# Patient Record
Sex: MALE | Race: WHITE | Employment: OTHER | URBAN - NONMETROPOLITAN AREA
[De-identification: names, ages, dates, MRNs, and addresses within clinical notes are randomized per-mention and may not be internally consistent; named-entity substitution may affect disease eponyms.]

---

## 2021-10-18 ENCOUNTER — OFFICE VISIT (OUTPATIENT)
Dept: FAMILY MEDICINE CLINIC | Age: 51
End: 2021-10-18

## 2021-10-18 VITALS
WEIGHT: 311.4 LBS | TEMPERATURE: 97.1 F | SYSTOLIC BLOOD PRESSURE: 124 MMHG | DIASTOLIC BLOOD PRESSURE: 82 MMHG | OXYGEN SATURATION: 96 % | HEART RATE: 95 BPM | HEIGHT: 72 IN | BODY MASS INDEX: 42.18 KG/M2 | RESPIRATION RATE: 19 BRPM

## 2021-10-18 DIAGNOSIS — L03.314 CELLULITIS OF RIGHT GROIN: Primary | ICD-10-CM

## 2021-10-18 PROCEDURE — 99214 OFFICE O/P EST MOD 30 MIN: CPT | Performed by: NURSE PRACTITIONER

## 2021-10-18 RX ORDER — SULFAMETHOXAZOLE AND TRIMETHOPRIM 800; 160 MG/1; MG/1
1 TABLET ORAL 2 TIMES DAILY
Qty: 14 TABLET | Refills: 0 | Status: ON HOLD
Start: 2021-10-18 | End: 2021-10-22 | Stop reason: HOSPADM

## 2021-10-18 RX ORDER — CEPHALEXIN 500 MG/1
500 CAPSULE ORAL 4 TIMES DAILY
Qty: 28 CAPSULE | Refills: 0 | Status: ON HOLD
Start: 2021-10-18 | End: 2021-10-22 | Stop reason: SDUPTHER

## 2021-10-18 NOTE — PROGRESS NOTES
Chief Complaint:   Other (patient stated that he had an ingrown hair a year ago. . he now has the same issue going on now. .. once in awhile he has fluid coming out of it. ..no he does not. . put a hot compress on it 3-4 times a day. . it has been a constant discomfort for a day and a half. . he did have a low grade fever last night and the chills. . patient stated that he had testicular cancer when he was 30. )    History of Present Illness   Source of history provided by:  patient. Laurel Han is a 53 y.o. old male who presents to walk-in for evaluation of redness to the scrotum, which began 2 days ago. He has had this intermittently for the past 1 year. Reports the area is warm to touch, moderately painful, and swollen. Denies lymphangitic streaking. There is minimal drainage when he squeezes it. Since onset the symptoms have progressed. He has been using warm compresses with minimal relief. Denies any fever, chills, HA, recent illness, myalgias, nausea, vomiting, or lethargy. He reports he did use a hot water bottle to help with the symptoms, did not use a towel over the bottle and did burn himself on his thigh. Review of Systems    Unless otherwise stated in this report or unable to obtain because of the patient's clinical or mental status as evidenced by the medical record, this patients's positive and negative responses for Review of Systems, constitutional, psych, eyes, ENT, cardiovascular, respiratory, gastrointestinal, neurological, genitourinary, musculoskeletal, integument systems and systems related to the presenting problem are either stated in the preceding or were negative for the symptoms and/or complaints related to the medical problem. Past Medical History:  has a past medical history of Cancer ([address] Depression, Diabetes mellitus ([address] Heart failure ([address] and Hypertension.    Past Surgical History:  has a past surgical history that includes Testicle removal.  Social History: reports that he has never smoked. He has never used smokeless tobacco. He reports current alcohol use of about 10.0 standard drinks of alcohol per week. He reports that he does not use drugs. Family History: family history is not on file. Allergies: Patient has no known allergies. Physical Exam   Vital Signs:  /82   Pulse 95   Temp 97.1 °F (36.2 °C)   Resp 19   Ht 6' (1.829 m)   Wt (!) 311 lb 6.4 oz (141.3 kg)   SpO2 96%   BMI 42.23 kg/m²    Oxygen Saturation Interpretation: Normal.    Constitutional:  Alert, development consistent with age. NAD. Lungs:  CTAB without wheezing, rales, or rhonchi. Heart:  Regular rate and rhythm, no pathologic murmurs, rubs, or gallops. Skin:  Normal turgor and appropriately dry to touch. Erythematous, indurated region over the right groin, consistent with cellulitis. Moderate TTP and warmth over the same area. There is no active bleeding or drainage noted. No lymphangitic streaking. No fluctuance noted. There is a small fluid filled blister noted to the right inner thigh without signs of infection. Neurological:  Orientation age-appropriate unless noted elseware. Motor functions intact. Test Results Section   (All laboratory and radiology results have been personally reviewed by myself)  Labs:  No results found for this visit on 10/18/21. Imaging: All Radiology results interpreted by Radiologist unless otherwise noted. US EXTREMITY RIGHT NON VASC LIMITED    Result Date: 10/18/2021  EXAMINATION: NONVASCULAR ULTRASOUND OF THE RIGHT EXTREMITY 10/18/2021 9:46 am COMPARISON: None. HISTORY: ORDERING SYSTEM PROVIDED HISTORY: Cellulitis of right groin. Rule out abscess TECHNOLOGIST PROVIDED HISTORY: Cellulitis versus abscess of right groin and right scrotal sac.  1 year off and on. This procedure can be scheduled via [employer]. Access your [employer] account by visiting [employer]. Reason for exam:->cellulitis right groin, r/o abscess.  What reading provider will be dictating this exam?->CRC FINDINGS: Imaging of the right groin and right scrotum are reveals scattered slight nonspecific microlithiasis in the right testicle. Small right hydrocele. Patient reports history of left orchiectomy due to cancer in 2005. Thickened right scrotal wall with increased color flow suggesting hyperemia. Tissues appear edematous. Similar findings in adjacent inguinal tissue. No ultrasound evidence of well-defined fluid collection to suggest mature abscess. Right testicular microlithiasis Small right hydrocele Status post left orchiectomy for cancer Right scrotal wall thickening with edema and hyperemia. Similar findings in adjacent inguinal tissue. No ultrasound evidence of mature abscess      Assessment / Plan   Impression(s):  Mary Ann Nguyen was seen today for other. Diagnoses and all orders for this visit:    Cellulitis of right groin  -     sulfamethoxazole-trimethoprim (BACTRIM DS;SEPTRA DS) 800-160 MG per tablet; Take 1 tablet by mouth 2 times daily for 7 days  -     cephALEXin (KEFLEX) 500 MG capsule; Take 1 capsule by mouth 4 times daily for 7 days  -     Bentley Becker MD, Urology, [place] ([facility])  -     US SOFT TISSUE LIMITED AREA; Future    Ultrasound of area obtained in office showing right testicular microlithiasis, right small hydrocele and right scrotal wall thickening with edema similar in adjacent inguinal tissue. There is no signs of abscess on ultrasound. Prescriptions for Bactrim and Keflex sent to pharmacy, side effects discussed. He has seen urology in the past for an orchiectomy with Dr. Eleanore Mortimer, he will see them again for follow-up on this. He was advised ER if symptoms change or worsen. Red flag symptoms discussed with patient. Patient verbalized understanding is agreeable plan of care. All questions were answered. Return if symptoms worsen or fail to improve.     Electronically signed by HERI Liu CNP   DD: 10/18/21    **This report was transcribed using voice recognition software. Every effort was made to ensure accuracy; however, inadvertent computerized transcription errors may be present.

## 2021-10-21 ENCOUNTER — ANESTHESIA EVENT (OUTPATIENT)
Dept: OPERATING ROOM | Age: 51
DRG: 602 | End: 2021-10-21

## 2021-10-21 ENCOUNTER — APPOINTMENT (OUTPATIENT)
Dept: CT IMAGING | Age: 51
DRG: 602 | End: 2021-10-21

## 2021-10-21 ENCOUNTER — ANESTHESIA (OUTPATIENT)
Dept: OPERATING ROOM | Age: 51
DRG: 602 | End: 2021-10-21

## 2021-10-21 ENCOUNTER — HOSPITAL ENCOUNTER (INPATIENT)
Age: 51
LOS: 1 days | Discharge: HOME | DRG: 602 | End: 2021-10-22
Attending: EMERGENCY MEDICINE | Admitting: UROLOGY

## 2021-10-21 VITALS — OXYGEN SATURATION: 93 % | TEMPERATURE: 100 F | DIASTOLIC BLOOD PRESSURE: 68 MMHG | SYSTOLIC BLOOD PRESSURE: 111 MMHG

## 2021-10-21 DIAGNOSIS — L03.314 CELLULITIS OF RIGHT GROIN: ICD-10-CM

## 2021-10-21 DIAGNOSIS — M72.6 NECROTIZING FASCIITIS (HCC): Primary | ICD-10-CM

## 2021-10-21 PROBLEM — N49.2 SCROTUM, ABSCESS: Status: ACTIVE | Noted: 2021-10-21

## 2021-10-21 LAB
ALBUMIN SERPL-MCNC: 3.8 G/DL (ref 3.5–5.2)
ALP BLD-CCNC: 74 U/L (ref 40–129)
ALT SERPL-CCNC: 25 U/L (ref 0–40)
ANION GAP SERPL CALCULATED.3IONS-SCNC: 14 MMOL/L (ref 7–16)
AST SERPL-CCNC: 21 U/L (ref 0–39)
BASOPHILS ABSOLUTE: 0 E9/L (ref 0–0.2)
BASOPHILS RELATIVE PERCENT: 0 % (ref 0–2)
BILIRUB SERPL-MCNC: 0.7 MG/DL (ref 0–1.2)
BUN BLDV-MCNC: 19 MG/DL (ref 6–20)
CALCIUM SERPL-MCNC: 9.6 MG/DL (ref 8.6–10.2)
CHLORIDE BLD-SCNC: 97 MMOL/L (ref 98–107)
CO2: 23 MMOL/L (ref 22–29)
CREAT SERPL-MCNC: 0.9 MG/DL (ref 0.7–1.2)
EOSINOPHILS ABSOLUTE: 0 E9/L (ref 0.05–0.5)
EOSINOPHILS RELATIVE PERCENT: 0 % (ref 0–6)
GFR AFRICAN AMERICAN: >60
GFR NON-AFRICAN AMERICAN: >60 ML/MIN/1.73
GLUCOSE BLD-MCNC: 192 MG/DL (ref 74–99)
HCT VFR BLD CALC: 39.4 % (ref 37–54)
HEMOGLOBIN: 13.2 G/DL (ref 12.5–16.5)
LACTIC ACID: 1.7 MMOL/L (ref 0.5–2.2)
LYMPHOCYTES ABSOLUTE: 1.03 E9/L (ref 1.5–4)
LYMPHOCYTES RELATIVE PERCENT: 10.5 % (ref 20–42)
MCH RBC QN AUTO: 28.8 PG (ref 26–35)
MCHC RBC AUTO-ENTMCNC: 33.5 % (ref 32–34.5)
MCV RBC AUTO: 86 FL (ref 80–99.9)
METER GLUCOSE: 212 MG/DL (ref 74–99)
MONOCYTES ABSOLUTE: 0.56 E9/L (ref 0.1–0.95)
MONOCYTES RELATIVE PERCENT: 6.2 % (ref 2–12)
NEUTROPHILS ABSOLUTE: 7.8 E9/L (ref 1.8–7.3)
NEUTROPHILS RELATIVE PERCENT: 83.3 % (ref 43–80)
NUCLEATED RED BLOOD CELLS: 0 /100 WBC
PDW BLD-RTO: 13.1 FL (ref 11.5–15)
PLATELET # BLD: 149 E9/L (ref 130–450)
PMV BLD AUTO: 10 FL (ref 7–12)
POTASSIUM REFLEX MAGNESIUM: 4.2 MMOL/L (ref 3.5–5)
RBC # BLD: 4.58 E12/L (ref 3.8–5.8)
RBC # BLD: NORMAL 10*6/UL
SODIUM BLD-SCNC: 134 MMOL/L (ref 132–146)
TOTAL PROTEIN: 7.8 G/DL (ref 6.4–8.3)
TOXIC GRANULATION: ABNORMAL
VACUOLATED NEUTROPHILS: ABNORMAL
WBC # BLD: 9.4 E9/L (ref 4.5–11.5)

## 2021-10-21 PROCEDURE — 1200000000 HC SEMI PRIVATE

## 2021-10-21 PROCEDURE — 7100000000 HC PACU RECOVERY - FIRST 15 MIN: Performed by: UROLOGY

## 2021-10-21 PROCEDURE — 99283 EMERGENCY DEPT VISIT LOW MDM: CPT

## 2021-10-21 PROCEDURE — 3600000012 HC SURGERY LEVEL 2 ADDTL 15MIN: Performed by: UROLOGY

## 2021-10-21 PROCEDURE — 0V9500Z DRAINAGE OF SCROTUM WITH DRAINAGE DEVICE, OPEN APPROACH: ICD-10-PCS | Performed by: UROLOGY

## 2021-10-21 PROCEDURE — 2580000003 HC RX 258: Performed by: NURSE ANESTHETIST, CERTIFIED REGISTERED

## 2021-10-21 PROCEDURE — 2500000003 HC RX 250 WO HCPCS: Performed by: UROLOGY

## 2021-10-21 PROCEDURE — 6360000002 HC RX W HCPCS: Performed by: UROLOGY

## 2021-10-21 PROCEDURE — 2500000003 HC RX 250 WO HCPCS: Performed by: EMERGENCY MEDICINE

## 2021-10-21 PROCEDURE — 6360000002 HC RX W HCPCS: Performed by: NURSE ANESTHETIST, CERTIFIED REGISTERED

## 2021-10-21 PROCEDURE — 3700000000 HC ANESTHESIA ATTENDED CARE: Performed by: UROLOGY

## 2021-10-21 PROCEDURE — 2709999900 HC NON-CHARGEABLE SUPPLY: Performed by: UROLOGY

## 2021-10-21 PROCEDURE — 6370000000 HC RX 637 (ALT 250 FOR IP): Performed by: INTERNAL MEDICINE

## 2021-10-21 PROCEDURE — 87088 URINE BACTERIA CULTURE: CPT

## 2021-10-21 PROCEDURE — 3600000002 HC SURGERY LEVEL 2 BASE: Performed by: UROLOGY

## 2021-10-21 PROCEDURE — 6360000002 HC RX W HCPCS: Performed by: ANESTHESIOLOGY

## 2021-10-21 PROCEDURE — 87205 SMEAR GRAM STAIN: CPT

## 2021-10-21 PROCEDURE — 74177 CT ABD & PELVIS W/CONTRAST: CPT

## 2021-10-21 PROCEDURE — 6360000004 HC RX CONTRAST MEDICATION: Performed by: RADIOLOGY

## 2021-10-21 PROCEDURE — 87186 SC STD MICRODIL/AGAR DIL: CPT

## 2021-10-21 PROCEDURE — 2500000003 HC RX 250 WO HCPCS: Performed by: NURSE ANESTHETIST, CERTIFIED REGISTERED

## 2021-10-21 PROCEDURE — 6360000002 HC RX W HCPCS: Performed by: EMERGENCY MEDICINE

## 2021-10-21 PROCEDURE — 83605 ASSAY OF LACTIC ACID: CPT

## 2021-10-21 PROCEDURE — 96365 THER/PROPH/DIAG IV INF INIT: CPT

## 2021-10-21 PROCEDURE — 3700000001 HC ADD 15 MINUTES (ANESTHESIA): Performed by: UROLOGY

## 2021-10-21 PROCEDURE — 87040 BLOOD CULTURE FOR BACTERIA: CPT

## 2021-10-21 PROCEDURE — 87077 CULTURE AEROBIC IDENTIFY: CPT

## 2021-10-21 PROCEDURE — 2580000003 HC RX 258: Performed by: EMERGENCY MEDICINE

## 2021-10-21 PROCEDURE — 85025 COMPLETE CBC W/AUTO DIFF WBC: CPT

## 2021-10-21 PROCEDURE — 7100000001 HC PACU RECOVERY - ADDTL 15 MIN: Performed by: UROLOGY

## 2021-10-21 PROCEDURE — 82962 GLUCOSE BLOOD TEST: CPT

## 2021-10-21 PROCEDURE — 80053 COMPREHEN METABOLIC PANEL: CPT

## 2021-10-21 PROCEDURE — 87070 CULTURE OTHR SPECIMN AEROBIC: CPT

## 2021-10-21 PROCEDURE — 2580000003 HC RX 258: Performed by: UROLOGY

## 2021-10-21 RX ORDER — SODIUM CHLORIDE 0.9 % (FLUSH) 0.9 %
5-40 SYRINGE (ML) INJECTION PRN
Status: DISCONTINUED | OUTPATIENT
Start: 2021-10-21 | End: 2021-10-22

## 2021-10-21 RX ORDER — SODIUM CHLORIDE 0.9 % (FLUSH) 0.9 %
5-40 SYRINGE (ML) INJECTION EVERY 12 HOURS SCHEDULED
Status: DISCONTINUED | OUTPATIENT
Start: 2021-10-21 | End: 2021-10-22 | Stop reason: HOSPADM

## 2021-10-21 RX ORDER — NICOTINE POLACRILEX 4 MG
15 LOZENGE BUCCAL PRN
Status: DISCONTINUED | OUTPATIENT
Start: 2021-10-21 | End: 2021-10-22

## 2021-10-21 RX ORDER — FENTANYL CITRATE 50 UG/ML
INJECTION, SOLUTION INTRAMUSCULAR; INTRAVENOUS PRN
Status: DISCONTINUED | OUTPATIENT
Start: 2021-10-21 | End: 2021-10-21 | Stop reason: SDUPTHER

## 2021-10-21 RX ORDER — LIDOCAINE HYDROCHLORIDE 20 MG/ML
INJECTION, SOLUTION EPIDURAL; INFILTRATION; INTRACAUDAL; PERINEURAL PRN
Status: DISCONTINUED | OUTPATIENT
Start: 2021-10-21 | End: 2021-10-21 | Stop reason: SDUPTHER

## 2021-10-21 RX ORDER — SODIUM CHLORIDE 9 MG/ML
INJECTION, SOLUTION INTRAVENOUS CONTINUOUS
Status: DISCONTINUED | OUTPATIENT
Start: 2021-10-21 | End: 2021-10-22

## 2021-10-21 RX ORDER — SUCCINYLCHOLINE/SOD CL,ISO/PF 200MG/10ML
SYRINGE (ML) INTRAVENOUS PRN
Status: DISCONTINUED | OUTPATIENT
Start: 2021-10-21 | End: 2021-10-21 | Stop reason: SDUPTHER

## 2021-10-21 RX ORDER — ONDANSETRON 2 MG/ML
4 INJECTION INTRAMUSCULAR; INTRAVENOUS EVERY 6 HOURS PRN
Status: DISCONTINUED | OUTPATIENT
Start: 2021-10-21 | End: 2021-10-22

## 2021-10-21 RX ORDER — SODIUM CHLORIDE 9 MG/ML
INJECTION, SOLUTION INTRAVENOUS CONTINUOUS PRN
Status: DISCONTINUED | OUTPATIENT
Start: 2021-10-21 | End: 2021-10-21 | Stop reason: SDUPTHER

## 2021-10-21 RX ORDER — IBUPROFEN 400 MG/1
400 TABLET ORAL EVERY 6 HOURS PRN
Status: DISCONTINUED | OUTPATIENT
Start: 2021-10-21 | End: 2021-10-22

## 2021-10-21 RX ORDER — SODIUM CHLORIDE 9 MG/ML
25 INJECTION, SOLUTION INTRAVENOUS PRN
Status: DISCONTINUED | OUTPATIENT
Start: 2021-10-21 | End: 2021-10-22 | Stop reason: HOSPADM

## 2021-10-21 RX ORDER — ONDANSETRON 2 MG/ML
INJECTION INTRAMUSCULAR; INTRAVENOUS PRN
Status: DISCONTINUED | OUTPATIENT
Start: 2021-10-21 | End: 2021-10-21 | Stop reason: SDUPTHER

## 2021-10-21 RX ORDER — MIDAZOLAM HYDROCHLORIDE 1 MG/ML
INJECTION INTRAMUSCULAR; INTRAVENOUS PRN
Status: DISCONTINUED | OUTPATIENT
Start: 2021-10-21 | End: 2021-10-21 | Stop reason: SDUPTHER

## 2021-10-21 RX ORDER — DEXAMETHASONE SODIUM PHOSPHATE 4 MG/ML
INJECTION, SOLUTION INTRA-ARTICULAR; INTRALESIONAL; INTRAMUSCULAR; INTRAVENOUS; SOFT TISSUE PRN
Status: DISCONTINUED | OUTPATIENT
Start: 2021-10-21 | End: 2021-10-21 | Stop reason: SDUPTHER

## 2021-10-21 RX ORDER — CLINDAMYCIN PHOSPHATE 900 MG/50ML
900 INJECTION INTRAVENOUS ONCE
Status: COMPLETED | OUTPATIENT
Start: 2021-10-21 | End: 2021-10-21

## 2021-10-21 RX ORDER — FENTANYL CITRATE 50 UG/ML
25 INJECTION, SOLUTION INTRAMUSCULAR; INTRAVENOUS EVERY 5 MIN PRN
Status: DISCONTINUED | OUTPATIENT
Start: 2021-10-21 | End: 2021-10-21 | Stop reason: HOSPADM

## 2021-10-21 RX ORDER — BUPIVACAINE HYDROCHLORIDE 2.5 MG/ML
INJECTION, SOLUTION EPIDURAL; INFILTRATION; INTRACAUDAL PRN
Status: DISCONTINUED | OUTPATIENT
Start: 2021-10-21 | End: 2021-10-21 | Stop reason: ALTCHOICE

## 2021-10-21 RX ORDER — LANOLIN ALCOHOL/MO/W.PET/CERES
9 CREAM (GRAM) TOPICAL NIGHTLY PRN
Status: DISCONTINUED | OUTPATIENT
Start: 2021-10-21 | End: 2021-10-22

## 2021-10-21 RX ORDER — PROPOFOL 10 MG/ML
INJECTION, EMULSION INTRAVENOUS PRN
Status: DISCONTINUED | OUTPATIENT
Start: 2021-10-21 | End: 2021-10-21 | Stop reason: SDUPTHER

## 2021-10-21 RX ORDER — DEXTROSE MONOHYDRATE 25 G/50ML
12.5 INJECTION, SOLUTION INTRAVENOUS PRN
Status: DISCONTINUED | OUTPATIENT
Start: 2021-10-21 | End: 2021-10-22

## 2021-10-21 RX ORDER — ROCURONIUM BROMIDE 10 MG/ML
INJECTION, SOLUTION INTRAVENOUS PRN
Status: DISCONTINUED | OUTPATIENT
Start: 2021-10-21 | End: 2021-10-21 | Stop reason: SDUPTHER

## 2021-10-21 RX ORDER — PANTOPRAZOLE SODIUM 40 MG/1
40 TABLET, DELAYED RELEASE ORAL
Status: DISCONTINUED | OUTPATIENT
Start: 2021-10-22 | End: 2021-10-22 | Stop reason: HOSPADM

## 2021-10-21 RX ORDER — DEXTROSE MONOHYDRATE 50 MG/ML
100 INJECTION, SOLUTION INTRAVENOUS PRN
Status: DISCONTINUED | OUTPATIENT
Start: 2021-10-21 | End: 2021-10-22

## 2021-10-21 RX ADMIN — VANCOMYCIN HYDROCHLORIDE 3000 MG: 1 INJECTION, POWDER, LYOPHILIZED, FOR SOLUTION INTRAVENOUS at 18:17

## 2021-10-21 RX ADMIN — SUGAMMADEX 250 MG: 100 INJECTION, SOLUTION INTRAVENOUS at 15:37

## 2021-10-21 RX ADMIN — IOPAMIDOL 75 ML: 755 INJECTION, SOLUTION INTRAVENOUS at 12:48

## 2021-10-21 RX ADMIN — ROCURONIUM BROMIDE 50 MG: 10 INJECTION, SOLUTION INTRAVENOUS at 15:04

## 2021-10-21 RX ADMIN — SODIUM CHLORIDE: 9 INJECTION, SOLUTION INTRAVENOUS at 17:42

## 2021-10-21 RX ADMIN — SODIUM CHLORIDE: 9 INJECTION, SOLUTION INTRAVENOUS at 14:49

## 2021-10-21 RX ADMIN — Medication 9 MG: at 20:52

## 2021-10-21 RX ADMIN — PIPERACILLIN SODIUM AND TAZOBACTAM SODIUM 4500 MG: 4; .5 INJECTION, POWDER, LYOPHILIZED, FOR SOLUTION INTRAVENOUS at 13:07

## 2021-10-21 RX ADMIN — CLINDAMYCIN PHOSPHATE 900 MG: 900 INJECTION, SOLUTION INTRAVENOUS at 15:02

## 2021-10-21 RX ADMIN — FENTANYL CITRATE 50 MCG: 50 INJECTION, SOLUTION INTRAMUSCULAR; INTRAVENOUS at 15:46

## 2021-10-21 RX ADMIN — MIDAZOLAM 2 MG: 1 INJECTION INTRAMUSCULAR; INTRAVENOUS at 14:49

## 2021-10-21 RX ADMIN — LIDOCAINE HYDROCHLORIDE 100 MG: 20 INJECTION, SOLUTION EPIDURAL; INFILTRATION; INTRACAUDAL; PERINEURAL at 14:56

## 2021-10-21 RX ADMIN — IBUPROFEN 400 MG: 400 TABLET, FILM COATED ORAL at 20:52

## 2021-10-21 RX ADMIN — FENTANYL CITRATE 100 MCG: 50 INJECTION, SOLUTION INTRAMUSCULAR; INTRAVENOUS at 15:23

## 2021-10-21 RX ADMIN — ONDANSETRON 4 MG: 2 INJECTION INTRAMUSCULAR; INTRAVENOUS at 15:31

## 2021-10-21 RX ADMIN — FENTANYL CITRATE 100 MCG: 50 INJECTION, SOLUTION INTRAMUSCULAR; INTRAVENOUS at 14:56

## 2021-10-21 RX ADMIN — DEXAMETHASONE SODIUM PHOSPHATE 8 MG: 4 INJECTION, SOLUTION INTRAMUSCULAR; INTRAVENOUS at 14:56

## 2021-10-21 RX ADMIN — HYDROMORPHONE HYDROCHLORIDE 0.5 MG: 1 INJECTION, SOLUTION INTRAMUSCULAR; INTRAVENOUS; SUBCUTANEOUS at 16:14

## 2021-10-21 RX ADMIN — PROPOFOL 250 MG: 10 INJECTION, EMULSION INTRAVENOUS at 14:56

## 2021-10-21 RX ADMIN — INSULIN LISPRO 1 UNITS: 100 INJECTION, SOLUTION INTRAVENOUS; SUBCUTANEOUS at 22:21

## 2021-10-21 RX ADMIN — Medication 160 MG: at 14:56

## 2021-10-21 RX ADMIN — HYDROMORPHONE HYDROCHLORIDE 0.5 MG: 1 INJECTION, SOLUTION INTRAMUSCULAR; INTRAVENOUS; SUBCUTANEOUS at 16:26

## 2021-10-21 ASSESSMENT — PAIN DESCRIPTION - ORIENTATION
ORIENTATION: RIGHT

## 2021-10-21 ASSESSMENT — PAIN SCALES - GENERAL
PAINLEVEL_OUTOF10: 5
PAINLEVEL_OUTOF10: 0
PAINLEVEL_OUTOF10: 3
PAINLEVEL_OUTOF10: 7
PAINLEVEL_OUTOF10: 6
PAINLEVEL_OUTOF10: 7

## 2021-10-21 ASSESSMENT — PULMONARY FUNCTION TESTS
PIF_VALUE: 25
PIF_VALUE: 13
PIF_VALUE: 26
PIF_VALUE: 25
PIF_VALUE: 18
PIF_VALUE: 1
PIF_VALUE: 25
PIF_VALUE: 23
PIF_VALUE: 24
PIF_VALUE: 14
PIF_VALUE: 25
PIF_VALUE: 25
PIF_VALUE: 22
PIF_VALUE: 1
PIF_VALUE: 25
PIF_VALUE: 25
PIF_VALUE: 7
PIF_VALUE: 25
PIF_VALUE: 23
PIF_VALUE: 23
PIF_VALUE: 1
PIF_VALUE: 1
PIF_VALUE: 16
PIF_VALUE: 25
PIF_VALUE: 24
PIF_VALUE: 25
PIF_VALUE: 2
PIF_VALUE: 23
PIF_VALUE: 15
PIF_VALUE: 5
PIF_VALUE: 15
PIF_VALUE: 25
PIF_VALUE: 15
PIF_VALUE: 23
PIF_VALUE: 1
PIF_VALUE: 24
PIF_VALUE: 16
PIF_VALUE: 23
PIF_VALUE: 25
PIF_VALUE: 1
PIF_VALUE: 1
PIF_VALUE: 23
PIF_VALUE: 15
PIF_VALUE: 23
PIF_VALUE: 11
PIF_VALUE: 2
PIF_VALUE: 1
PIF_VALUE: 13
PIF_VALUE: 1
PIF_VALUE: 25
PIF_VALUE: 24
PIF_VALUE: 16
PIF_VALUE: 16
PIF_VALUE: 29

## 2021-10-21 ASSESSMENT — PAIN DESCRIPTION - LOCATION
LOCATION: SCROTUM

## 2021-10-21 ASSESSMENT — PAIN DESCRIPTION - FREQUENCY
FREQUENCY: CONTINUOUS

## 2021-10-21 ASSESSMENT — PAIN DESCRIPTION - PAIN TYPE
TYPE: SURGICAL PAIN

## 2021-10-21 ASSESSMENT — ENCOUNTER SYMPTOMS
COUGH: 0
ABDOMINAL PAIN: 0
SHORTNESS OF BREATH: 0
BACK PAIN: 0

## 2021-10-21 ASSESSMENT — PAIN DESCRIPTION - PROGRESSION: CLINICAL_PROGRESSION: GRADUALLY IMPROVING

## 2021-10-21 ASSESSMENT — PAIN - FUNCTIONAL ASSESSMENT: PAIN_FUNCTIONAL_ASSESSMENT: ACTIVITIES ARE NOT PREVENTED

## 2021-10-21 ASSESSMENT — PAIN DESCRIPTION - ONSET: ONSET: ON-GOING

## 2021-10-21 ASSESSMENT — PAIN DESCRIPTION - DESCRIPTORS
DESCRIPTORS: DISCOMFORT
DESCRIPTORS: DISCOMFORT

## 2021-10-21 NOTE — H&P
10/21/2021 12:17 PM  Service: Urology  Group: [facility] ([facility])    Betty Allen  [id number]     Chief Complaint:    Scrotal abscess    History of Present Illness: The patient is a 53 y.o. male patient who initially presented to our office today for evaluation of right groin cellulitis and drainage. He had been placed on bactrim DS I tablet PO BID x 7 days and Keflex 500mg 1 PO QID for 7 days by his PCP on Monday. Since that time he has had increased pain, swelling, and drainage. He was advised to come to the ED after being seen in our office this AM to rule out Jaycob's. He denies fevers. He is known to our practice and is a patient of Dr. Lalita Becker. He has a history of left testicular cancer s/p left inguinal orchiectomy in 2005. The patient has been on testosterone replacement for management of hypogonadism associated with his previous orchiectomy and low testosterone he is being managed elsewhere he is now being managed in our office for this  He denies frequency urgency hematuria or previous known urinary tract problems such as calculus disease prostatitis. There is no family history of prostate cancer his father has had voiding symptoms in the past managed by us      Past Medical History:   Diagnosis Date    Cancer [facility]) 2005    testicular    Depression     Prescribed antidepressant for 10+ yrs    Diabetes mellitus [address]     Heart failure ([facility] [address] 12/2011    Hypertension 2007         Past Surgical History:   Procedure Laterality Date    TESTICLE REMOVAL         Medications Prior to Admission:    Not in a hospital admission. Allergies:    Patient has no known allergies. Social History:    reports that he has never smoked. He has never used smokeless tobacco. He reports current alcohol use of about 10.0 standard drinks of alcohol per week. He reports that he does not use drugs.     Family History:   Non-contributory to this Urological problem  family history is not on file.    Review of Systems:  Constitutional: No fever or chills   Respiratory: negative for cough and hemoptysis  Cardiovascular: negative for chest pain and dyspnea  Gastrointestinal: negative for abdominal pain, diarrhea, nausea and vomiting   Derm: negative for rash and skin lesion(s)  Neurological: negative for seizures and tremors  Musculoskeletal: Negative    Psychiatric: Negative   : As above in the HPI, otherwise negative  All other reviews are negative    Physical Exam:     Vitals: There were no vitals taken for this visit. General:  Awake, alert, oriented X 3. No apparent distress. HEENT:  Normocephalic, atraumatic. Lungs:  Respirations symmetric and non-labored. Abdomen:  soft, nontender, no masses  Extremities:  No clubbing, cyanosis, or edema  Skin:  Warm and dry, no open lesions or rashes  Neuro: There are no motor or sensory deficits in the 4 quadrant extremities   Rectal: deferred  Genitourinary:  Left testis absent, unable to palpate right testis due to swelling. Right groin swelling into right lateral aspect of scrotum with erythema, + purulent pink tinged drainage. Right groin and pubic area cellulitic and indurated, there is some fluctuance noted to the right lateral aspect of the scrotum towards the base. No crepitus    Labs:     No results for input(s): WBC, RBC, HGB, HCT, MCV, MCH, MCHC, RDW, PLT, MPV in the last 72 hours. No results for input(s): CREATININE in the last 72 hours.     No results found for: PSA        Assessment/plan:  Cellulitis of right groin/scrotum with drainage   Hx of testicular cancer s/p left orchiectomy 2005       Keep NPO  Will obtain STAT CT abdomen pelvis to rule out necrotizing fascitis   Consult ID   Has been started on Clindamycin and Zosyn in the ED  Labs pending  Will follow pending above CT results       Electronical  ly signed by HERI Baldwin CNP on 10/21/2021 at 12:17 PM  I interviewed and examined the patient with the nurse practitioner and agree with the above note and plan I did tell the patient that we would preserve the right testicle under every circumstance possible usually this infection does not involve the testicle or cord itself. Patient has a significant abscess with air in the right inguinal and right scrotal area and he is actively draining from the scrotum does not appear to involve the perineum at this time.   We discussed indications risks and alternatives to surgical incision and drainage placement of a drain I will use some Prolene sutures to be placed loosely with the drain to increase his recovery

## 2021-10-21 NOTE — PROGRESS NOTES
80  Report called to  [address] [facility]  And pt contacted his family member and let them know his room number

## 2021-10-21 NOTE — ED NOTES
Name: Nathaly Salinas  : 1970  MRN: [id number]    Date: 10/21/2021    Benefits of immediately proceeding with Radiology exam outweigh the risks and therefore the following is being waived:      [] Pregnancy test    [] Protocol for Iodine allergy    [] MRI questionnaire    [x] BUN/Creatinine        DO Gini Prater DO  10/21/21 7827

## 2021-10-21 NOTE — ED PROVIDER NOTES
This is a 69-year-old male with a past medical history of testicular cancer who presents to the ED for evaluation of a wound. Patient states that several months ago he was triming his scrotal region and cut his right scrotum. He states that he has been having some issues on and off for months with drainage from the scrotum. Patient states that it has been worsening over the past one week. Patient states that he noticed increased redness and drainage over the weekend had an US which showed no abscess and was started on both keflex and Bactrim. Patient was seen at his urologist office and was sent her for further evaluation and concern for Fournie[place]. Patient has no reported fevers or chills. The history is provided by the patient. No  was used. Review of Systems   Constitutional: Negative for fever. HENT: Negative for congestion. Eyes: Negative for visual disturbance. Respiratory: Negative for cough and shortness of breath. Cardiovascular: Negative for chest pain. Gastrointestinal: Negative for abdominal pain. Endocrine: Negative for polyuria. Genitourinary: Positive for scrotal swelling and testicular pain. Musculoskeletal: Negative for back pain. Skin: Negative for rash. Allergic/Immunologic: Negative for immunocompromised state. Neurological: Negative for headaches. Hematological: Does not bruise/bleed easily. Psychiatric/Behavioral: Negative for confusion. Physical Exam  Vitals and nursing note reviewed. Constitutional:       General: He is not in acute distress. Appearance: He is well-developed. He is obese. HENT:      Head: Normocephalic and atraumatic. Mouth/Throat:      Mouth: Mucous membranes are moist.   Eyes:      Extraocular Movements: Extraocular movements intact. Pupils: Pupils are equal, round, and reactive to light. Neck:      Vascular: No JVD. Cardiovascular:      Rate and Rhythm: Normal rate and regular rhythm. Pulmonary:      Effort: Pulmonary effort is normal.      Breath sounds: No wheezing, rhonchi or rales. Chest:      Chest wall: No tenderness. Abdominal:      General: There is no distension. Palpations: Abdomen is soft. Tenderness: There is no guarding or rebound. Hernia: No hernia is present. Comments: Large area of scrotal swelling with purulent discharge, extending up to right groin region, warmth to touch   Musculoskeletal:      Cervical back: Normal range of motion and neck supple. Right lower leg: No edema. Left lower leg: No edema. Skin:     General: Skin is warm and dry. Capillary Refill: Capillary refill takes less than 2 seconds. Neurological:      General: No focal deficit present. Mental Status: He is alert and oriented to person, place, and time. Cranial Nerves: No cranial nerve deficit. Psychiatric:         Mood and Affect: Mood normal.         Behavior: Behavior normal.          Procedures     MDM  Number of Diagnoses or Management Options  Necrotizing fasciitis [facility])  Diagnosis management comments: Patient presented to the ED straight from his urologist's office for evaluation of draining wound to his right scrotum and going to his groin. Patient had concern for necrotizing facsciits, urology team almost immediately met patient in room. Patient promptly treated with broad spectrum antibiotics and wound cultures sent off. CT concerning for necrotizing fascitis. Patient was admitted and taken to OR promptly by urology team.       ED Course as of Oct 22 1359   Thu Oct 21, 2021   1210 Called CT, BUN/Cr waived. Urology at [facility], antibiotics ordered    [BP]   1327 Call placed out to urology team, patient stable in no distress    [BP]      ED Course User Index  [BP] Gerda Alfaro DO      ED Course as of Oct 22 1359   Thu Oct 21, 2021   1210 Called CT, BUN/Cr waived.  Urology at [facility], antibiotics ordered    [BP]   1327 Call placed out to urology team, patient stable in no distress    [BP]      ED Course User Index  [BP] Nelson Salas, DO       --------------------------------------------- PAST HISTORY ---------------------------------------------  Past Medical History:  has a past medical history of Cancer ([facility] [address] Depression, Diabetes mellitus ([facility] [address] Heart failure ([facility] [address] and Hypertension. Past Surgical History:  has a past surgical history that includes Testicle removal and Scrotal surgery (Right, 10/21/2021). Social History:  reports that he has never smoked. He has never used smokeless tobacco. He reports current alcohol use of about 10.0 standard drinks of alcohol per week. He reports that he does not use drugs. Family History: family history is not on file. The patients home medications have been reviewed. Allergies: Patient has no known allergies. -------------------------------------------------- RESULTS -------------------------------------------------    Lab  Results for orders placed or performed during the hospital encounter of 10/21/21   Culture, Body Fluid    Specimen: Body Fluid   Result Value Ref Range    Gram Stain Result Refer to ordered Gram stain for results    Culture, Urine    Specimen: Urine, catheter   Result Value Ref Range    Urine Culture, Routine Growth not present, incubation continues    Gram Stain    Specimen: Fluid   Result Value Ref Range    Gram Stain Orderable       Gram stain performed from swab, interpret results with  caution. Swab specimens of sterile fluids are inferior to  aspirate specimens for organism recovery.   Rare Polymorphonuclear leukocytes  Epithelial cells not seen  Moderate Gram positive cocci in clusters     Comprehensive Metabolic Panel w/ Reflex to MG   Result Value Ref Range    Sodium 134 132 - 146 mmol/L    Potassium reflex Magnesium 4.2 3.5 - 5.0 mmol/L    Chloride 97 (L) 98 - 107 mmol/L    CO2 23 22 - 29 mmol/L    Anion Gap 14 7 - 16 mmol/L    Glucose 192 (H) 74 - 99 mg/dL    BUN 19 6 - 20 mg/dL    CREATININE 0.9 0.7 - 1.2 mg/dL    GFR Non-African American >60 >=60 mL/min/1.73    GFR African American >60     Calcium 9.6 8.6 - 10.2 mg/dL    Total Protein 7.8 6.4 - 8.3 g/dL    Albumin 3.8 3.5 - 5.2 g/dL    Total Bilirubin 0.7 0.0 - 1.2 mg/dL    Alkaline Phosphatase 74 40 - 129 U/L    ALT 25 0 - 40 U/L    AST 21 0 - 39 U/L   CBC Auto Differential   Result Value Ref Range    WBC 9.4 4.5 - 11.5 E9/L    RBC 4.58 3.80 - 5.80 E12/L    Hemoglobin 13.2 12.5 - 16.5 g/dL    Hematocrit 39.4 37.0 - 54.0 %    MCV 86.0 80.0 - 99.9 fL    MCH 28.8 26.0 - 35.0 pg    MCHC 33.5 32.0 - 34.5 %    RDW 13.1 11.5 - 15.0 fL    Platelets 515 500 - 124 E9/L    MPV 10.0 7.0 - 12.0 fL    Neutrophils % 83.3 (H) 43.0 - 80.0 %    Lymphocytes % 10.5 (L) 20.0 - 42.0 %    Monocytes % 6.2 2.0 - 12.0 %    Eosinophils % 0.0 0.0 - 6.0 %    Basophils % 0.0 0.0 - 2.0 %    Neutrophils Absolute 7.80 (H) 1.80 - 7.30 E9/L    Lymphocytes Absolute 1.03 (L) 1.50 - 4.00 E9/L    Monocytes Absolute 0.56 0.10 - 0.95 E9/L    Eosinophils Absolute 0.00 (L) 0.05 - 0.50 E9/L    Basophils Absolute 0.00 0.00 - 0.20 E9/L    nRBC 0.0 /100 WBC    Toxic Granulation 1+     Vacuolated Neutrophils 1+     RBC Morphology Normal    Lactic Acid, Plasma   Result Value Ref Range    Lactic Acid 1.7 0.5 - 2.2 mmol/L   Basic Metabolic Panel   Result Value Ref Range    Sodium 134 132 - 146 mmol/L    Potassium 5.0 3.5 - 5.0 mmol/L    Chloride 102 98 - 107 mmol/L    CO2 25 22 - 29 mmol/L    Anion Gap 7 7 - 16 mmol/L    Glucose 203 (H) 74 - 99 mg/dL    BUN 14 6 - 20 mg/dL    CREATININE 0.8 0.7 - 1.2 mg/dL    GFR Non-African American >60 >=60 mL/min/1.73    GFR African American >60     Calcium 9.1 8.6 - 10.2 mg/dL   Calcium, Ionized   Result Value Ref Range    Calcium, Ion 1.24 1.15 - 1.33 mmol/L   C-Reactive Protein   Result Value Ref Range    CRP 13.4 (H) 0.0 - 0.4 mg/dL   Magnesium   Result Value Ref Range    Magnesium 2.3 1.6 - 2.6 mg/dL   Lipid Panel % 0.0 0.0 - 2.0 %    Neutrophils Absolute 6.99 1.80 - 7.30 E9/L    Immature Granulocytes # 0.03 E9/L    Lymphocytes Absolute 0.33 (L) 1.50 - 4.00 E9/L    Monocytes Absolute 0.44 0.10 - 0.95 E9/L    Eosinophils Absolute 0.00 (L) 0.05 - 0.50 E9/L    Basophils Absolute 0.00 0.00 - 0.20 E9/L    RBC Morphology Normal    POCT Glucose   Result Value Ref Range    Meter Glucose 212 (H) 74 - 99 mg/dL   POCT Glucose   Result Value Ref Range    Meter Glucose 150 (H) 74 - 99 mg/dL   POCT Glucose   Result Value Ref Range    Meter Glucose 139 (H) 74 - 99 mg/dL       Radiology  CT ABDOMEN PELVIS W IV CONTRAST Additional Contrast? None   Final Result   1. Moderately severe right inguinal, scrotal, and anterior pudendal   cellulitis. There are small pockets of subcutaneous and deep soft tissue gas   concerning for necrotizing fasciitis. 2. Nonobstructive right nephrolithiasis. 3. 6 mm right lower lobe pulmonary nodule. RECOMMENDATIONS:   RECOMMENDATIONS: Fleischner Society guidelines for follow-up and management   of incidentally detected pulmonary nodules:      Single Solid Nodule:      Nodule size equals 6-8 mm: In a low-risk patient, CT at 6-12 months, then   consider CT at 18-24 months. In a high-risk patient, CT at 6-12 months, then   CT at 18-24 months. Low risk patients include individuals with minimal or absent history of   smoking and other known risk factors. - High risk patients include   individuals with a history or smoking or known risk factors. Radiology 2017 http://pubs. rsna.org/doi/full/10.1148/radiol. 4731326755                 ------------------------- NURSING NOTES AND VITALS REVIEWED ---------------------------  Date / Time Roomed:  10/21/2021 11:51 AM  ED Bed Assignment:  [id number]    The nursing notes within the ED encounter and vital signs as below have been reviewed.    Patient Vitals for the past 24 hrs:   BP Temp Temp src Pulse Resp SpO2 Height Weight   10/22/21 0745 109/69 97.8 °F (36.6 °C) Oral 73 16 -- -- --   10/22/21 0259 104/62 97.9 °F (36.6 °C) Oral 63 16 -- -- --   10/22/21 0023 -- -- -- -- -- -- -- (!) 315 lb (142.9 kg)   10/22/21 0019 (!) 104/57 97.8 °F (36.6 °C) Oral 74 18 96 % -- --   10/21/21 2045 113/63 98.6 °F (37 °C) Oral 76 18 95 % -- --   10/21/21 1730 -- -- -- -- -- -- 6' 0.01\" (1.829 m) (!) 311 lb 8.2 oz (141.3 kg)   10/21/21 1715 133/73 98.7 °F (37.1 °C) Oral 86 16 97 % -- --   10/21/21 1630 127/68 -- -- 80 18 95 % -- --   10/21/21 1615 125/75 -- -- 82 18 94 % -- --   10/21/21 1600 120/61 -- -- 85 18 95 % -- --   10/21/21 1549 121/77 -- -- -- -- -- -- --   10/21/21 1548 -- 98.5 °F (36.9 °C) -- 90 16 93 % -- --       Oxygen Saturation Interpretation: Normal      ------------------------------------------ PROGRESS NOTES ------------------------------------------  Re-evaluation(s):  Time: 1215. Patients symptoms show no change  Repeat physical examination is not changed    Time: 1244. Patients symptoms show no change  Repeat physical examination is not changed    I have spoken with the patient and discussed todays results, in addition to providing specific details for the plan of care and counseling regarding the diagnosis and prognosis. Their questions are answered at this time and they are agreeable with the plan.      --------------------------------- ADDITIONAL PROVIDER NOTES ---------------------------------  Consultations:  Spoke with Dr. Gloria Becker,  They will admit this patient. This patient's ED course included: a personal history and physicial examination, re-evaluation prior to disposition, multiple bedside re-evaluations, IV medications, cardiac monitoring, continuous pulse oximetry and complex medical decision making and emergency management    This patient has remained hemodynamically stable during their ED course.     Please note that the withdrawal or failure to initiate urgent interventions for this patient would likely result in a life threatening deterioration or permanent disability. Accordingly this patient received 33 minutes of critical care time, excluding separately billable procedures. Clinical Impression  1. Necrotizing fasciitis ([facility] [address]    2. Cellulitis of right groin          Disposition  Patient's disposition: Other Disposition: TO OR  Patient's condition is stable.       Yulissa Garcia DO  10/22/21 1400

## 2021-10-21 NOTE — BRIEF OP NOTE
Brief Postoperative Note      Patient: Noemí Salgado  YOB: 1970  MRN: [id number]    Date of Procedure: 10/21/2021    Pre-Op Diagnosis: Fornier's infection right hemiscrotum solitary right kidney previous left orchiectomy for testicular cancer    Post-Op Diagnosis: Same right cord and testes intact     Abscess right hemiscrotum extending to right inguinal area  Procedure. Incision and drainage and culture of a abscess in the right hemiscrotum and inguinal area and placement of a drain    Surgeon(s):  Radha Becker MD    Assistant:  None    Anesthesia: General and local Marcaine point 5 plain in the incision edges approximately 8 cc used    Estimated Blood Loss (mL): Normal less than 20 cc    Complications: None    Specimens:   Urine for aerobic and anaerobic culture    Implants:  None      Drains: Penrose    Findings:  The abscess in the scrotum was drained there is still induration in the inguinal area that is not associated with fluid accumulation it was not incised as there was no purulent material from that area    Electronically signed by Rosario Laird MD on 10/21/2021 at 2:59 PM

## 2021-10-21 NOTE — ANESTHESIA PRE PROCEDURE
Department of Anesthesiology  Preprocedure Note       Name:  Tj Chisholm   Age:  53 y.o.  :  1970                                          MRN:  [id number]         Date:  10/21/2021      Surgeon: Saul France):  Talib Becker MD    Procedure: Procedure(s):  INCISION AND DRAINAGE RIGHT INGUINAL AND SCROTAL NECROTIZING FASCIITIS    Medications prior to admission:   Prior to Admission medications    Medication Sig Start Date End Date Taking?  Authorizing Provider   sulfamethoxazole-trimethoprim (BACTRIM DS;SEPTRA DS) 800-160 MG per tablet Take 1 tablet by mouth 2 times daily for 7 days 10/18/21 10/25/21  HERI Mcfarland CNP   cephALEXin (KEFLEX) 500 MG capsule Take 1 capsule by mouth 4 times daily for 7 days 10/18/21 10/25/21  HERI Mcfarland CNP       Current medications:    Current Facility-Administered Medications   Medication Dose Route Frequency Provider Last Rate Last Admin    clindamycin (CLEOCIN) 900 mg in dextrose 5 % 50 mL IVPB  900 mg IntraVENous Once Jemal Russell DO        vancomycin (VANCOCIN) 3,000 mg in dextrose 5 % 600 mL IVPB  3,000 mg IntraVENous Once Jemal Russell DO         Current Outpatient Medications   Medication Sig Dispense Refill    sulfamethoxazole-trimethoprim (BACTRIM DS;SEPTRA DS) 800-160 MG per tablet Take 1 tablet by mouth 2 times daily for 7 days 14 tablet 0    cephALEXin (KEFLEX) 500 MG capsule Take 1 capsule by mouth 4 times daily for 7 days 28 capsule 0       Allergies:  No Known Allergies    Problem List:    Patient Active Problem List   Diagnosis Code    Major depressive disorder, single episode, moderate ([facility] F32.1    Posttraumatic stress disorder F43.10    Other and unspecified alcohol dependence, unspecified drinking behavior F10.20       Past Medical History:        Diagnosis Date    Cancer [facility])     testicular    Depression     Prescribed antidepressant for 10+ yrs    Diabetes mellitus [facility]     Heart failure ([facility] 2011    Hypertension 2007       Past Surgical History:        Procedure Laterality Date    TESTICLE REMOVAL         Social History:    Social History     Tobacco Use    Smoking status: Never Smoker    Smokeless tobacco: Never Used   Substance Use Topics    Alcohol use: Yes     Alcohol/week: 10.0 standard drinks     Types: 8 Cans of beer, 2 Shots of liquor per week     Comment: three times a week                                Counseling given: Not Answered      Vital Signs (Current):   Vitals:    10/21/21 1322   BP: 123/67   Pulse: 88   Resp: 18   Temp: 99.5 °F (37.5 °C)   TempSrc: Oral   SpO2: 95%                                              BP Readings from Last 3 Encounters:   10/21/21 123/67   10/18/21 124/82       NPO Status: Time of last liquid consumption: 0930                        Time of last solid consumption: 0930                        Date of last liquid consumption: 10/21/21                        Date of last solid food consumption: 10/21/21    BMI:   Wt Readings from Last 3 Encounters:   10/18/21 (!) 311 lb 6.4 oz (141.3 kg)     There is no height or weight on file to calculate BMI.    CBC:   Lab Results   Component Value Date    WBC 9.4 10/21/2021    RBC 4.58 10/21/2021    HGB 13.2 10/21/2021    HCT 39.4 10/21/2021    MCV 86.0 10/21/2021    RDW 13.1 10/21/2021     10/21/2021       CMP:   Lab Results   Component Value Date     10/21/2021    K 4.2 10/21/2021    CL 97 10/21/2021    CO2 23 10/21/2021    BUN 19 10/21/2021    CREATININE 0.9 10/21/2021    GFRAA >60 10/21/2021    LABGLOM >60 10/21/2021    GLUCOSE 192 10/21/2021    PROT 7.8 10/21/2021    CALCIUM 9.6 10/21/2021    BILITOT 0.7 10/21/2021    ALKPHOS 74 10/21/2021    AST 21 10/21/2021    ALT 25 10/21/2021       POC Tests: No results for input(s): POCGLU, POCNA, POCK, POCCL, POCBUN, POCHEMO, POCHCT in the last 72 hours.     Coags: No results found for: PROTIME, INR, APTT    HCG (If Applicable): No results found for: PREGTESTUR, PREGSERUM, HCG, HCGQUANT     ABGs: No results found for: PHART, PO2ART, WGK1GTS, VSV0PAP, BEART, O9CULPUS     Type & Screen (If Applicable):  No results found for: LABABO, LABRH    Drug/Infectious Status (If Applicable):  No results found for: HIV, HEPCAB    COVID-19 Screening (If Applicable): No results found for: COVID19        Anesthesia Evaluation  Patient summary reviewed no history of anesthetic complications:   Airway: Mallampati: IV  TM distance: >3 FB   Neck ROM: full  Mouth opening: > = 3 FB Dental: normal exam         Pulmonary:Negative Pulmonary ROS breath sounds clear to auscultation                             Cardiovascular:    (+) hypertension:,         Rhythm: regular                      Neuro/Psych:   (+) psychiatric history:             ROS comment: Alcohol dependence  PTSD GI/Hepatic/Renal: Neg GI/Hepatic/Renal ROS  (+) morbid obesity          Endo/Other:    (+) DiabetesType II DM, , malignancy/cancer (testicular ). Abdominal:             Vascular: negative vascular ROS. Other Findings:           Anesthesia Plan      general     ASA 3 - emergent       Induction: intravenous. MIPS: Postoperative opioids intended and Prophylactic antiemetics administered. Anesthetic plan and risks discussed with patient. Plan discussed with CRNA.           [address]akesh Vaughn,    10/21/2021

## 2021-10-21 NOTE — ANESTHESIA POSTPROCEDURE EVALUATION
Department of Anesthesiology  Postprocedure Note    Patient: Anne Marie Mock  MRN: [id number]  [place]: 1970  Date of evaluation: 10/21/2021  Time:  4:55 PM     Procedure Summary     Date: 10/21/21 Room / Location: [facility] OR [address] [place] [address]    Anesthesia Start: 8092 Anesthesia Stop: 7750    Procedure: INCISION AND DRAINAGE RIGHT INGUINAL AND SCROTAL NECROTIZING [address] (Right ) Diagnosis: (ABSCESS)    Surgeons: Rhiannon Wheeler MD Responsible Provider: Roman Virgen DO    Anesthesia Type: general ASA Status: 3 - Emergent          Anesthesia Type: general    Jayro Phase I: Jayro Score: 10    Jayro Phase II:      Last vitals: Reviewed and per EMR flowsheets.        Anesthesia Post Evaluation    Patient location during evaluation: PACU  Patient participation: complete - patient participated  Level of consciousness: awake and alert  Airway patency: patent  Nausea & Vomiting: no nausea and no vomiting  Complications: no  Cardiovascular status: hemodynamically stable  Respiratory status: acceptable  Hydration status: euvolemic

## 2021-10-22 VITALS
TEMPERATURE: 97.8 F | RESPIRATION RATE: 16 BRPM | BODY MASS INDEX: 42.66 KG/M2 | HEIGHT: 72 IN | DIASTOLIC BLOOD PRESSURE: 69 MMHG | HEART RATE: 73 BPM | WEIGHT: 315 LBS | SYSTOLIC BLOOD PRESSURE: 109 MMHG | OXYGEN SATURATION: 96 %

## 2021-10-22 PROBLEM — M72.6 NECROTIZING FASCIITIS (HCC): Status: ACTIVE | Noted: 2021-10-21

## 2021-10-22 LAB
ALBUMIN SERPL-MCNC: 3.3 G/DL (ref 3.5–5.2)
ALP BLD-CCNC: 70 U/L (ref 40–129)
ALT SERPL-CCNC: 25 U/L (ref 0–40)
ANION GAP SERPL CALCULATED.3IONS-SCNC: 7 MMOL/L (ref 7–16)
AST SERPL-CCNC: 19 U/L (ref 0–39)
BASOPHILS ABSOLUTE: 0 E9/L (ref 0–0.2)
BASOPHILS RELATIVE PERCENT: 0 % (ref 0–2)
BILIRUB SERPL-MCNC: 0.5 MG/DL (ref 0–1.2)
BILIRUBIN DIRECT: 0.2 MG/DL (ref 0–0.3)
BILIRUBIN, INDIRECT: 0.3 MG/DL (ref 0–1)
BUN BLDV-MCNC: 14 MG/DL (ref 6–20)
C-REACTIVE PROTEIN: 13.4 MG/DL (ref 0–0.4)
CALCIUM IONIZED: 1.24 MMOL/L (ref 1.15–1.33)
CALCIUM SERPL-MCNC: 9.1 MG/DL (ref 8.6–10.2)
CHLORIDE BLD-SCNC: 102 MMOL/L (ref 98–107)
CHOLESTEROL, TOTAL: 151 MG/DL (ref 0–199)
CO2: 25 MMOL/L (ref 22–29)
CREAT SERPL-MCNC: 0.8 MG/DL (ref 0.7–1.2)
EOSINOPHILS ABSOLUTE: 0 E9/L (ref 0.05–0.5)
EOSINOPHILS RELATIVE PERCENT: 0 % (ref 0–6)
FERRITIN: 1354 NG/ML
FOLATE: 16.8 NG/ML (ref 4.8–24.2)
GFR AFRICAN AMERICAN: >60
GFR NON-AFRICAN AMERICAN: >60 ML/MIN/1.73
GLUCOSE BLD-MCNC: 203 MG/DL (ref 74–99)
GRAM STAIN ORDERABLE: NORMAL
HBA1C MFR BLD: 5.8 % (ref 4–5.6)
HCT VFR BLD CALC: 34.4 % (ref 37–54)
HDLC SERPL-MCNC: 17 MG/DL
HEMOGLOBIN: 11.5 G/DL (ref 12.5–16.5)
IMMATURE GRANULOCYTES #: 0.03 E9/L
IMMATURE GRANULOCYTES %: 0.4 % (ref 0–5)
IRON SATURATION: 39 % (ref 20–55)
IRON: 79 MCG/DL (ref 59–158)
LACTIC ACID, SEPSIS: 1.3 MMOL/L (ref 0.5–1.9)
LDL CHOLESTEROL CALCULATED: 101 MG/DL (ref 0–99)
LYMPHOCYTES ABSOLUTE: 0.33 E9/L (ref 1.5–4)
LYMPHOCYTES RELATIVE PERCENT: 4.2 % (ref 20–42)
MAGNESIUM: 2.3 MG/DL (ref 1.6–2.6)
MCH RBC QN AUTO: 29 PG (ref 26–35)
MCHC RBC AUTO-ENTMCNC: 33.4 % (ref 32–34.5)
MCV RBC AUTO: 86.9 FL (ref 80–99.9)
METER GLUCOSE: 139 MG/DL (ref 74–99)
METER GLUCOSE: 150 MG/DL (ref 74–99)
MONOCYTES ABSOLUTE: 0.44 E9/L (ref 0.1–0.95)
MONOCYTES RELATIVE PERCENT: 5.6 % (ref 2–12)
NEUTROPHILS ABSOLUTE: 6.99 E9/L (ref 1.8–7.3)
NEUTROPHILS RELATIVE PERCENT: 89.8 % (ref 43–80)
PDW BLD-RTO: 13.2 FL (ref 11.5–15)
PHOSPHORUS: 2.7 MG/DL (ref 2.5–4.5)
PLATELET # BLD: 146 E9/L (ref 130–450)
PMV BLD AUTO: 10 FL (ref 7–12)
POTASSIUM SERPL-SCNC: 5 MMOL/L (ref 3.5–5)
PROCALCITONIN: 1.37 NG/ML (ref 0–0.08)
RBC # BLD: 3.96 E12/L (ref 3.8–5.8)
RBC # BLD: NORMAL 10*6/UL
SEDIMENTATION RATE, ERYTHROCYTE: 95 MM/HR (ref 0–15)
SODIUM BLD-SCNC: 134 MMOL/L (ref 132–146)
TOTAL IRON BINDING CAPACITY: 201 MCG/DL (ref 250–450)
TOTAL PROTEIN: 6.7 G/DL (ref 6.4–8.3)
TRIGL SERPL-MCNC: 164 MG/DL (ref 0–149)
TSH SERPL DL<=0.05 MIU/L-ACNC: 0.38 UIU/ML (ref 0.27–4.2)
URIC ACID, SERUM: 2.7 MG/DL (ref 3.4–7)
VITAMIN B-12: 713 PG/ML (ref 211–946)
VLDLC SERPL CALC-MCNC: 33 MG/DL
WBC # BLD: 7.8 E9/L (ref 4.5–11.5)

## 2021-10-22 PROCEDURE — 84100 ASSAY OF PHOSPHORUS: CPT

## 2021-10-22 PROCEDURE — 80076 HEPATIC FUNCTION PANEL: CPT

## 2021-10-22 PROCEDURE — 6360000002 HC RX W HCPCS: Performed by: INTERNAL MEDICINE

## 2021-10-22 PROCEDURE — 82746 ASSAY OF FOLIC ACID SERUM: CPT

## 2021-10-22 PROCEDURE — 36415 COLL VENOUS BLD VENIPUNCTURE: CPT

## 2021-10-22 PROCEDURE — 83735 ASSAY OF MAGNESIUM: CPT

## 2021-10-22 PROCEDURE — 83605 ASSAY OF LACTIC ACID: CPT

## 2021-10-22 PROCEDURE — 82607 VITAMIN B-12: CPT

## 2021-10-22 PROCEDURE — 6370000000 HC RX 637 (ALT 250 FOR IP): Performed by: NURSE PRACTITIONER

## 2021-10-22 PROCEDURE — 83550 IRON BINDING TEST: CPT

## 2021-10-22 PROCEDURE — 86140 C-REACTIVE PROTEIN: CPT

## 2021-10-22 PROCEDURE — 85025 COMPLETE CBC W/AUTO DIFF WBC: CPT

## 2021-10-22 PROCEDURE — 6370000000 HC RX 637 (ALT 250 FOR IP): Performed by: INTERNAL MEDICINE

## 2021-10-22 PROCEDURE — 83036 HEMOGLOBIN GLYCOSYLATED A1C: CPT

## 2021-10-22 PROCEDURE — 2580000003 HC RX 258: Performed by: UROLOGY

## 2021-10-22 PROCEDURE — 80048 BASIC METABOLIC PNL TOTAL CA: CPT

## 2021-10-22 PROCEDURE — 84550 ASSAY OF BLOOD/URIC ACID: CPT

## 2021-10-22 PROCEDURE — 80061 LIPID PANEL: CPT

## 2021-10-22 PROCEDURE — 82330 ASSAY OF CALCIUM: CPT

## 2021-10-22 PROCEDURE — 82728 ASSAY OF FERRITIN: CPT

## 2021-10-22 PROCEDURE — 85651 RBC SED RATE NONAUTOMATED: CPT

## 2021-10-22 PROCEDURE — 82962 GLUCOSE BLOOD TEST: CPT

## 2021-10-22 PROCEDURE — 84145 PROCALCITONIN (PCT): CPT

## 2021-10-22 PROCEDURE — 83540 ASSAY OF IRON: CPT

## 2021-10-22 PROCEDURE — 84443 ASSAY THYROID STIM HORMONE: CPT

## 2021-10-22 RX ORDER — CEPHALEXIN 500 MG/1
500 CAPSULE ORAL EVERY 12 HOURS SCHEDULED
Status: DISCONTINUED | OUTPATIENT
Start: 2021-10-22 | End: 2021-10-22 | Stop reason: HOSPADM

## 2021-10-22 RX ORDER — CEPHALEXIN 500 MG/1
500 CAPSULE ORAL 2 TIMES DAILY
Qty: 14 CAPSULE | Refills: 0 | Status: SHIPPED | OUTPATIENT
Start: 2021-10-22 | End: 2021-10-29

## 2021-10-22 RX ADMIN — PANTOPRAZOLE SODIUM 40 MG: 40 TABLET, DELAYED RELEASE ORAL at 06:14

## 2021-10-22 RX ADMIN — SODIUM CHLORIDE: 9 INJECTION, SOLUTION INTRAVENOUS at 06:15

## 2021-10-22 RX ADMIN — IBUPROFEN 400 MG: 400 TABLET, FILM COATED ORAL at 07:51

## 2021-10-22 RX ADMIN — ENOXAPARIN SODIUM 40 MG: 40 INJECTION SUBCUTANEOUS at 07:51

## 2021-10-22 RX ADMIN — CEPHALEXIN 500 MG: 500 CAPSULE ORAL at 13:19

## 2021-10-22 RX ADMIN — INSULIN LISPRO 1 UNITS: 100 INJECTION, SOLUTION INTRAVENOUS; SUBCUTANEOUS at 06:16

## 2021-10-22 ASSESSMENT — PAIN SCALES - GENERAL: PAINLEVEL_OUTOF10: 0

## 2021-10-22 NOTE — PROGRESS NOTES
Attention Dr Marylene Blender:  The patient's current BMI is 42.24 kg/m2 and is currently ordered Lovenox 40 mg daily. According to official package labeling, if the patient's BMI is greater than or equal to 40 kg/m2, the recommended dose for Lovenox for DVT prophylaxis is Lovenox 40 mg twice a day. Pharmacy recommends changing the dose to 40 mg twice a day.   Mumtaz Hicks, [facility]  10/21/2021  9:30 PM

## 2021-10-22 NOTE — PROGRESS NOTES
P Quality Flow/Interdisciplinary Rounds Progress Note        Quality Flow Rounds held on October 22, 2021    Disciplines Attending:  Bedside Nurse, ,  and Nursing Unit Leadership    Anne Marie Mock was admitted on 10/21/2021 11:51 AM    Anticipated Discharge Date:  Expected Discharge Date: 10/23/21    Disposition:    Simone Score:  Simone Scale Score: 20    Readmission Risk              Risk of Unplanned Readmission:  6           Discussed patient goal for the day, patient clinical progression, and barriers to discharge.   The following Goal(s) of the Day/Commitment(s) have been identified:  Diagnostics - Report Results and Labs - Report Results      [employer], RN  October 22, 2021

## 2021-10-22 NOTE — PLAN OF CARE
Problem: Pain:  Goal: Pain level will decrease  Description: Pain level will decrease  10/22/2021 1044 by Nickie Perez RN  Outcome: Met This Shift  10/21/2021 2342 by Alem Mg RN  Outcome: Met This Shift  Goal: Control of acute pain  Description: Control of acute pain  10/22/2021 1044 by Nickie Perez RN  Outcome: Met This Shift  10/21/2021 2342 by Alem Mg RN  Outcome: Met This Shift  Goal: Control of chronic pain  Description: Control of chronic pain  10/21/2021 2342 by Alem Mg RN  Outcome: Met This Shift

## 2021-10-22 NOTE — PROGRESS NOTES
10/22/2021 9:06 AM  Service: Urology  Group: [facility] (Eugenio/Clinton/David)    Larissa Collins  [id number]    Subjective:  Pt feels much better  Pain improved  Redness and edema still present to right groin but much   Improved from yesterday  Afebrile    Review of Systems  Constitutional: no sweat or chills  Respiratory: negative  Cardiovascular: negative for chest pain  Gastrointestinal: negative for abdominal pain and feels bloated  Dermatologic: no pruritis   Hematologic/lymphatic: negative  Musculoskeletal:negative  Neurological: negative  Endocrine: positive for hx of diabetes   Psychiatric: anxiious    Scheduled Meds:   sodium chloride flush  5-40 mL IntraVENous 2 times per day    pantoprazole  40 mg Oral QAM AC    enoxaparin  40 mg SubCUTAneous Daily    insulin lispro  0-6 Units SubCUTAneous TID WC    insulin lispro  0-3 Units SubCUTAneous Nightly       Objective:  Vitals:    10/22/21 0745   BP: 109/69   Pulse: 73   Resp: 16   Temp: 97.8 °F (36.6 °C)   SpO2:          Allergies: Patient has no known allergies. General Appearance: alert and oriented to person, place and time and in no acute distress  Skin: no rash or erythema  Head: normocephalic and atraumatic  Pulmonary/Chest: normal air movement, no respiratory distress and no chest wall tenderness  Abdomen: soft, right tenderness and cellulitis right groin, overall improved from yesterday. Genitalia: right scrotal edema, drains present, serosanguinous drainage on underwear (he lost his gauze). Left orchiectomy in the past. Overall improved from yesterday. Extremities: no edema    Galicia well positioned and draining clear urine.     Labs:     Recent Labs     10/22/21  0253      K 5.0      CO2 25   BUN 14   CREATININE 0.8   GLUCOSE 203*   CALCIUM 9.1       Lab Results   Component Value Date    HGB 11.5 10/22/2021    HCT 34.4 10/22/2021     10/22/2021  8:30 AM - Holden, Mhy Incoming Results From [facility]    Specimen Information: Fluid     Component Collected Lab   Gram Stain Orderable 10/21/2021  3:20 PM  [facility]   Gram stain performed from swab, interpret results with   caution. Swab specimens of sterile fluids are inferior to   aspirate specimens for organism recovery. Rare Polymorphonuclear leukocytes   Epithelial cells not seen   Moderate Gram positive cocci in clusters          Assessment: solitary right kidney  DM  Hx left testicular ca s/p orchiectomy   Incision and drainage of abscess right hemiscrotum cavity and  insertion of a drain. Post op day 1.     Plan:  Continue penrose drains  Change dressings prn as soiled  Continue antibiotics   Cultures pending  Encourage ambulation  Good bowel regimen  Discontinue lima today      Tyesha Castillo, APRN - CNP   [facility]

## 2021-10-22 NOTE — PROGRESS NOTES
Call out to Dr. Tatiana Lee per order, awaiting call back.     Electronically signed by Vickie Harmon RN on 10/21/2021 at 8:11 PM

## 2021-10-22 NOTE — OP NOTE
[address] [address]                                OPERATIVE REPORT    PATIENT NAME: Crystal Zhao                    :        1970  MED REC NO:   [id number]                            ROOM:       0507  ACCOUNT NO:   [de-identified]                           ADMIT DATE: 10/21/2021  PROVIDER:     Narciso Becker MD    DATE OF PROCEDURE:  10/21/2021    PREOPERATIVE DIAGNOSES:  Right hemiscrotal and inguinal abscess,  solitary right kidney, left testicle removed previously in , for  carcinoma without evidence of recurrence. OPERATION PERFORMED:  Incision and drainage of abscess cavity and  insertion of a drain. ANESTHESIA:  General and 8 mL of Marcaine 0.5 plain was used in the skin  edges and the drain incision site edges. ESTIMATED BLOOD LOSS:  20 mL. CONDITION:  Stable. DISPOSITION:  The patient would be admitted overnight. He did have a  Galicia catheter inserted and would probably have this removed in the  morning. SURGEON:  Liz Vasquez MD.    DESCRIPTION OF PROCEDURE:  The timeout was read by me, the Anesthesia,  and the operating room staff. We reviewed the history and physical,  allergy, and medication. No additional antibiotics were needed. He was  given _____ antibiotics in the emergency room. He was placed in the  lithotomy position and prepped and draped in the usual fashion. He had  what looked like a mature vent in his skin that was probably 6 mm in  greatest dimension, and I extended this in a cephalad direction for a  distance of about 4 cm and I did cultures for aerobic and anaerobic. I  really did not get a lot of fluid and when I finger palpated the area,  it appeared to be a one large cavity without loculations and seemed to  be well drained.   There was an indurated area in the inguinal area that  did not have associated fluctuance, and on CAT scan it looked indurated  as well. I did not incise this area. The spermatic cord and the  testicle appeared to be medial to this dissection and I did not dissect  the wall of the abscess cavity further fearing that it might injure the  inguinal or the spermatic cord. I used a power  and I was able  to irrigate the wound out, but there was again not much pus. I placed  two Penrose drains, one up to the inguinal area and one in the dependent  area of the scrotum and sutured in place with 2-0 Prolene and I closed  the incision loosely with Prolene. I palpated the perineal area and  there was no induration and there was no induration in the left  hemiscrotum. I could feel the right testicle, it appeared to be intact  and I did not explore the scrotum to identify it. The sponge, needle,  and instrument counts were correct. Blood loss was perhaps 20 mL. I  used the Marcaine 0.5 plain at the end of the procedure. I used the  incision, both the scrotal incision and the Penrose drain site excision. The patient had a Galicia catheter inserted, 16-Ukrainian catheter. Urine  was sent for culture, but it was grossly clear. I probably will remove  this catheter tomorrow once he is more mobile. The patient was sent to  Recovery in satisfactory condition.         Sanjay Walker MD    D: 10/21/2021 16:08:32       T: 10/22/2021 0:36:31     [email or web address]  Job#: [id number]     Doc#: [id number]    CC:  Nusrat Luis MD

## 2021-10-22 NOTE — DISCHARGE SUMMARY
Physician Discharge Summary     Patient ID:  Sharon Ppee  [id number]  53 y.o.  1970    Admit date: 10/21/2021    Discharge date and time: 10/22/2021    Admitting Physician: Roxie Briggs Eugenio, DO     Admission Diagnoses: Necrotizing fasciitis ([facility] [M72.6]  Scrotum, abscess [N49.2]    Discharge Diagnoses: right hemiscrotal and inguinal abscess, solitary right kidney    Hospital Course:   Pt admitted for draining right scrotal abscess and cellulitis right groin  He under went I&D of scrotal abscess cavity with insertion of drains. There was cellulitis but no abscess of inguinal area. Treatments: IV hydration, antibiotics: vancomycin, Zosyn and clindamycin and analgesia: Morphine and ibuprofen    Disposition: home    Patient Instructions:   Current Discharge Medication List      CONTINUE these medications which have CHANGED    Details   cephALEXin (KEFLEX) 500 MG capsule Take 1 capsule by mouth 2 times daily for 7 days  Qty: 14 capsule, Refills: 0    Associated Diagnoses: Cellulitis of right groin         STOP taking these medications       sulfamethoxazole-trimethoprim (BACTRIM DS;SEPTRA DS) 800-160 MG per tablet Comments:   Reason for Stopping:         CARVEDILOL PO Comments:   Reason for Stopping:         metFORMIN (GLUCOPHAGE) 500 MG tablet Comments:   Reason for Stopping:             Resume home meds   Start keflex  Follow up in 10 days  Sooner if fever or worsening symptoms  Change gauze when soiled, expect drainage   No \"manscaping\".     Signed:  HERI Larios - CNP  10/22/2021  12:21 PM

## 2021-10-23 LAB
BODY FLUID CULTURE, STERILE: ABNORMAL
GRAM STAIN RESULT: ABNORMAL
ORGANISM: ABNORMAL

## 2021-10-24 LAB — URINE CULTURE, ROUTINE: NORMAL

## 2021-10-26 LAB
BLOOD CULTURE, ROUTINE: NORMAL
CULTURE, BLOOD 2: NORMAL

## (undated) DEVICE — SPONGE,LAP,4"X18",XR,ST,5/PK,40PK/CS: Brand: MEDLINE INDUSTRIES, INC.

## (undated) DEVICE — DOUBLE BASIN SET: Brand: MEDLINE INDUSTRIES, INC.

## (undated) DEVICE — 4-PORT MANIFOLD: Brand: NEPTUNE 2

## (undated) DEVICE — READY WET SKIN SCRUB TRAY-LF: Brand: MEDLINE INDUSTRIES, INC.

## (undated) DEVICE — SYRINGE MED 10ML POLYPR LUERSLIP TIP FLAT TOP W/O SFTY DISP

## (undated) DEVICE — PACK PROCEDURE SURG GEN CUST

## (undated) DEVICE — GLOVE ORANGE PI 7   MSG9070

## (undated) DEVICE — NEEDLE HYPO 25GA L1.5IN BLU POLYPR HUB S STL REG BVL STR

## (undated) DEVICE — DRAIN SURG PENROSE 0.5X12 IN PREM SIL STRL

## (undated) DEVICE — CATHETER F BLLN 5CC 16FR 2 W HYDRGEL COAT LESS TRAUM LUB

## (undated) DEVICE — GAUZE,SPONGE,4"X4",8PLY,STRL,LF,10/TRAY: Brand: MEDLINE

## (undated) DEVICE — ELECTRODE PT RET AD L9FT HI MOIST COND ADH HYDRGEL CORDED

## (undated) DEVICE — GOWN,SIRUS,FABRNF,2XL,18/CS: Brand: MEDLINE

## (undated) DEVICE — INTENDED FOR TISSUE SEPARATION, AND OTHER PROCEDURES THAT REQUIRE A SHARP SURGICAL BLADE TO PUNCTURE OR CUT.: Brand: BARD-PARKER ® STAINLESS STEEL BLADES

## (undated) DEVICE — EXTRAS GU